# Patient Record
Sex: FEMALE | Race: WHITE | NOT HISPANIC OR LATINO | Employment: FULL TIME | URBAN - METROPOLITAN AREA
[De-identification: names, ages, dates, MRNs, and addresses within clinical notes are randomized per-mention and may not be internally consistent; named-entity substitution may affect disease eponyms.]

---

## 2020-09-08 DIAGNOSIS — G40.109 LOCALIZATION-RELATED FOCAL EPILEPSY WITH SIMPLE PARTIAL SEIZURES (H): Primary | ICD-10-CM

## 2020-09-08 RX ORDER — LEVETIRACETAM 500 MG/1
1 TABLET ORAL 2 TIMES DAILY
COMMUNITY
Start: 2020-08-03 | End: 2020-09-08

## 2020-09-08 RX ORDER — LEVETIRACETAM 500 MG/1
500 TABLET ORAL 2 TIMES DAILY
Qty: 60 TABLET | Refills: 0 | Status: SHIPPED | OUTPATIENT
Start: 2020-09-08 | End: 2020-10-09

## 2020-10-08 ENCOUNTER — TELEPHONE (OUTPATIENT)
Dept: NEUROLOGY | Facility: CLINIC | Age: 60
End: 2020-10-08

## 2020-10-08 DIAGNOSIS — G40.109 LOCALIZATION-RELATED FOCAL EPILEPSY WITH SIMPLE PARTIAL SEIZURES (H): ICD-10-CM

## 2020-10-08 NOTE — TELEPHONE ENCOUNTER
Pt lm that pharm had sent a request to refill her Keppra. Pt asking the status. [phone]   1. Follow up with SLP recommendation. 2. Consider discussion of GOC

## 2020-10-09 RX ORDER — LEVETIRACETAM 500 MG/1
500 TABLET ORAL 2 TIMES DAILY
Qty: 60 TABLET | Refills: 3 | Status: SHIPPED | OUTPATIENT
Start: 2020-10-09 | End: 2020-11-06

## 2020-10-09 NOTE — TELEPHONE ENCOUNTER
Patient informed we have not received refill request from pharmacy that I am aware of.  Patient reminded she is due for appointment which is scheduled for 11-6-2020.  Patient informed I would send refill request to Dr. Garvin.  Medication T'd for review and signature  Deana Hardy CMA on 10/9/2020 at 3:30 PM

## 2020-10-13 NOTE — TELEPHONE ENCOUNTER
I left message for patient that her refill was sent to pharmacy 10-9-2020 and asked for call back if she needs further assistance.  Deana Casanova RN on 10/13/2020 at 10:07 AM

## 2020-11-06 ENCOUNTER — VIRTUAL VISIT (OUTPATIENT)
Dept: NEUROLOGY | Facility: CLINIC | Age: 60
End: 2020-11-06
Payer: COMMERCIAL

## 2020-11-06 VITALS — BODY MASS INDEX: 21.19 KG/M2 | HEIGHT: 67 IN | WEIGHT: 135 LBS

## 2020-11-06 DIAGNOSIS — G40.109 LOCALIZATION-RELATED FOCAL EPILEPSY WITH SIMPLE PARTIAL SEIZURES (H): ICD-10-CM

## 2020-11-06 PROCEDURE — 99213 OFFICE O/P EST LOW 20 MIN: CPT | Mod: 95 | Performed by: PSYCHIATRY & NEUROLOGY

## 2020-11-06 RX ORDER — TRAZODONE HYDROCHLORIDE 50 MG/1
25-50 TABLET, FILM COATED ORAL AT BEDTIME
COMMUNITY
Start: 2020-09-24

## 2020-11-06 RX ORDER — CELECOXIB 200 MG/1
200 CAPSULE ORAL PRN
COMMUNITY

## 2020-11-06 RX ORDER — TRAMADOL HYDROCHLORIDE 50 MG/1
50-100 TABLET ORAL PRN
COMMUNITY
Start: 2020-10-12

## 2020-11-06 RX ORDER — LEVETIRACETAM 500 MG/1
500 TABLET ORAL 2 TIMES DAILY
Qty: 180 TABLET | Refills: 3 | Status: SHIPPED | OUTPATIENT
Start: 2020-11-06 | End: 2021-10-25

## 2020-11-06 RX ORDER — GABAPENTIN 100 MG/1
2 CAPSULE ORAL
COMMUNITY
Start: 2020-10-25

## 2020-11-06 RX ORDER — IPRATROPIUM BROMIDE 21 UG/1
2 SPRAY, METERED NASAL 2 TIMES DAILY
COMMUNITY
Start: 2020-08-27

## 2020-11-06 RX ORDER — ALBUTEROL SULFATE 90 UG/1
1 AEROSOL, METERED RESPIRATORY (INHALATION) PRN
COMMUNITY

## 2020-11-06 RX ORDER — OLOPATADINE HYDROCHLORIDE 1 MG/ML
1 SOLUTION/ DROPS OPHTHALMIC PRN
COMMUNITY

## 2020-11-06 RX ORDER — FLUOXETINE 20 MG/1
2 TABLET, FILM COATED ORAL DAILY
COMMUNITY
Start: 2020-10-28

## 2020-11-06 RX ORDER — FLUTICASONE FUROATE 100 UG/1
1 POWDER RESPIRATORY (INHALATION) DAILY
COMMUNITY
Start: 2020-10-17

## 2020-11-06 ASSESSMENT — MIFFLIN-ST. JEOR: SCORE: 1209.99

## 2020-11-06 NOTE — PATIENT INSTRUCTIONS
Nice to meet with you again today.  As we discussed, I think continuing on levetiracetam at this time would be warranted.  I refilled your levetiracetam for 1 years time.  We can see you in follow-up in 1 years time.  Good luck with your low back and left leg pain!

## 2020-11-06 NOTE — PROGRESS NOTES
"    Zuri Hutson  60 year old  MRN:[id number]  PCP: Risa Garnett  No ref. provider found    Allergies   Allergen Reactions     Animal Dander      Mold      Seasonal Allergies        Current Outpatient Medications   Medication Sig Dispense Refill     albuterol (PROAIR HFA/PROVENTIL HFA/VENTOLIN HFA) 108 (90 Base) MCG/ACT inhaler Inhale 1 puff into the lungs as needed for shortness of breath / dyspnea or wheezing       ARNUITY ELLIPTA 100 MCG/ACT inhaler Inhale 1 puff into the lungs daily       Calcium Carbonate-Vitamin D (CALCIUM-VITAMIN D3 PO) Take by mouth 2 times daily       celecoxib (CELEBREX) 200 MG capsule Take 200 mg by mouth as needed for moderate pain       DOCUSATE SODIUM PO Take by mouth daily       FLUoxetine 20 MG tablet Take 2 tablets by mouth daily       gabapentin (NEURONTIN) 100 MG capsule Take 2 capsules by mouth 3 times daily       ipratropium (ATROVENT) 0.03 % nasal spray Spray 2 sprays in nostril 2 times daily       levETIRAcetam (KEPPRA) 500 MG tablet Take 1 tablet (500 mg) by mouth 2 times daily 60 tablet 3     olopatadine (PATANOL) 0.1 % ophthalmic solution 1 drop as needed for allergies       Omega-3 Fatty Acids (FISH OIL PO) Take by mouth daily       Probiotic Product (PROBIOTIC DAILY PO) Take by mouth daily       traMADol (ULTRAM) 50 MG tablet Take  mg by mouth as needed       traZODone (DESYREL) 50 MG tablet Take 25-50 mg by mouth At Bedtime     Zuri Hutson is a 60 year old female who is being evaluated via a billable video visit.      The patient has been notified of following:     \"This video visit will be conducted via a call between you and your physician/provider. We have found that certain health care needs can be provided without the need for an in-person physical exam.  This service lets us provide the care you need with a video conversation.  If a prescription is necessary we can send it directly to your pharmacy.  If lab work is needed we can place an order for that and " "you can then stop by our lab to have the test done at a later time.    Video visits are billed at different rates depending on your insurance coverage.  Please reach out to your insurance provider with any questions.    If during the course of the call the physician/provider feels a video visit is not appropriate, you will not be charged for this service.\"    Patient has given verbal consent for Video visit? Yes  How would you like to obtain your AVS? Mail a copy  If you are dropped from the video visit, the video invite should be resent to: Send to e-mail at: [email or web address]  Will anyone else be joining your video visit? No        Video-Visit Details    Type of service:  Video Visit    Video Start Time: 10:34 AM  Video End Time: 10:47 AM    Originating Location (pt. Location): Home    Distant Location (provider location):  [facility] [place]     Platform used for Video Visit: Corey Garvin MD        CHIEF COMPLAINT: Follow-up of partial seizures    HISTORY SINCE LAST VISIT: I saw Ms. Hutson in virtual follow-up today of her partial seizures.  She has not had any loss of consciousness or awareness.  There have been no generalized tonic-clonic seizures.  She continues on levetiracetam, 500 mg twice daily.  She has not had any side effects with levetiracetam.  No irritability or anger issues.  No dizziness.  Since I had seen her last, she had had a left hemilaminectomy at L4-5 by Dr. Alamo for left leg pain.  1 month ago, she had recurrent pain and just had an epidural steroid injection yesterday which does appear to be helping.  No headache problems.  No loss of vision or double vision.  No speech or swallowing difficulties.      PAST MEDICAL HISTORY:   Surgical History    Surgery Date Site/Laterality Comments   [place] CONIZATION CERVIX KNIFE/LASER 1/1/1985 - 12/31/1985 1985     LEEP PROCEDURE 1/1/1988 - 12/31/1988   1988     WISDOM TEETH EXTRACTION          COLPOSCOPY " 11/01/2011   benign      COLONOSCOPY SCREENING 9/17/2012   [id number]     right breast cyst aspiration     early 2000s     JOINT REPLACEMENT 12/1/2015 - 12/31/2015 Left      COLPOSCOPY 08/22/2016   ECC DANIELLE I     COLPOSCOPY 11/24/2017   DANIELLE I     Extended lumbar hemilaminotomy, foraminotomy and diskectomy, L5, left sided 06/05/2020 Left by Dr. Richmond       Medical History    Medical History Date Comments   Asthma   mild   Seasonal allergies   Dust, animals   Depression       Screening cholesterol level 2008, 2011 wnl   Screening for thyroid disorder 2011 Normal per patient   Acne       ASCUS with positive high risk HPV 10/03/2011 ASCUS/HPV+   Seizures (HC)       Cervical high risk HPV (human papillomavirus) test positive 08/2018 8/2018, 07//28/2017,07/08/2016 NIL/HPV+. Plan: Colposcopy    Syncope       Arthritis           FAMILY HISTORY:  Family History   Problem Relation Age of Onset     Cancer Mother         Breast     Atrial fibrillation Father      Cancer Sister         Breast       SOCIAL HISTORY:  Social History     Socioeconomic History     Marital status:      Spouse name: Not on file     Number of children: Not on file     Years of education: Not on file     Highest education level: Not on file   Occupational History     Not on file   Social Needs     Financial resource strain: Not on file     Food insecurity     Worry: Not on file     Inability: Not on file     Transportation needs     Medical: Not on file     Non-medical: Not on file   Tobacco Use     Smoking status: Never Smoker     Smokeless tobacco: Never Used   Substance and Sexual Activity     Alcohol use: Yes     Comment: wine     Drug use: Never     Sexual activity: Not on file   Lifestyle     Physical activity     Days per week: Not on file     Minutes per session: Not on file     Stress: Not on file   Relationships     Social connections     Talks on phone: Not on file     Gets together: Not on file     Attends [employer] witness service: Not on  file     Active member of club or organization: Not on file     Attends meetings of clubs or organizations: Not on file     Relationship status: Not on file     Intimate partner violence     Fear of current or ex partner: Not on file     Emotionally abused: Not on file     Physically abused: Not on file     Forced sexual activity: Not on file   Other Topics Concern     Parent/sibling w/ CABG, MI or angioplasty before 65F 55M? Not Asked   Social History Narrative     Not on file       REVIEW OF SYSTEMS:    Constitutional: No fever chills weight loss weight gain    Eyes: No double vision or loss of vision   Musculoskeletal: Recurrent left leg pain.  Better after epidural steroid injection.  Neurologic: See above.      PHYSICAL EXAMINATION:    General appearance: No acute distress.       NEUROLOGIC EXAMINATION:    Alert oriented x3.  Speech is fluent.  Can tell me the name of the president.  Can spell world forward and backwards.  Naming and repetition normal.  Extraocular movements full.  Visual fields full.  Face symmetric.  Tongue midline.  No drift of arms.  Normal finger-nose-finger.      IMPRESSION: Focal onset seizures.  No recurrence since 2015.  These events have been very infrequent, with the first events having occurred in 2010.  I think it would be reasonable to have her continue on levetiracetam, as she has not had any side effects of the medication and no recurrent events.  She is in agreement.  I refilled her medication for 1 years time.  We will plan follow-up in 1 years time; sooner, if problems arise.            Lino Garvin MD

## 2020-11-06 NOTE — LETTER
"    11/6/2020         RE: Zuri Hutson  [address] [place] [id number]        Dear Colleague,    Thank you for referring your patient, Zuri Hutson, to the [facility] [place]. Please see a copy of my visit note below.        Zuri Hutson  60 year old  MRN:[id number]  PCP: Risa Garnett  No ref. provider found    Allergies   Allergen Reactions     Animal Dander      Mold      Seasonal Allergies        Current Outpatient Medications   Medication Sig Dispense Refill     albuterol (PROAIR HFA/PROVENTIL HFA/VENTOLIN HFA) 108 (90 Base) MCG/ACT inhaler Inhale 1 puff into the lungs as needed for shortness of breath / dyspnea or wheezing       ARNUITY ELLIPTA 100 MCG/ACT inhaler Inhale 1 puff into the lungs daily       Calcium Carbonate-Vitamin D (CALCIUM-VITAMIN D3 PO) Take by mouth 2 times daily       celecoxib (CELEBREX) 200 MG capsule Take 200 mg by mouth as needed for moderate pain       DOCUSATE SODIUM PO Take by mouth daily       FLUoxetine 20 MG tablet Take 2 tablets by mouth daily       gabapentin (NEURONTIN) 100 MG capsule Take 2 capsules by mouth 3 times daily       ipratropium (ATROVENT) 0.03 % nasal spray Spray 2 sprays in nostril 2 times daily       levETIRAcetam (KEPPRA) 500 MG tablet Take 1 tablet (500 mg) by mouth 2 times daily 60 tablet 3     olopatadine (PATANOL) 0.1 % ophthalmic solution 1 drop as needed for allergies       Omega-3 Fatty Acids (FISH OIL PO) Take by mouth daily       Probiotic Product (PROBIOTIC DAILY PO) Take by mouth daily       traMADol (ULTRAM) 50 MG tablet Take  mg by mouth as needed       traZODone (DESYREL) 50 MG tablet Take 25-50 mg by mouth At Bedtime     Zuri Hutson is a 60 year old female who is being evaluated via a billable video visit.      The patient has been notified of following:     \"This video visit will be conducted via a call between you and your physician/provider. We have found that certain health care needs can be provided " "without the need for an in-person physical exam.  This service lets us provide the care you need with a video conversation.  If a prescription is necessary we can send it directly to your pharmacy.  If lab work is needed we can place an order for that and you can then stop by our lab to have the test done at a later time.    Video visits are billed at different rates depending on your insurance coverage.  Please reach out to your insurance provider with any questions.    If during the course of the call the physician/provider feels a video visit is not appropriate, you will not be charged for this service.\"    Patient has given verbal consent for Video visit? Yes  How would you like to obtain your AVS? Mail a copy  If you are dropped from the video visit, the video invite should be resent to: Send to e-mail at: [email or web address]  Will anyone else be joining your video visit? No        Video-Visit Details    Type of service:  Video Visit    Video Start Time: 10:34 AM  Video End Time: 10:47 AM    Originating Location (pt. Location): Home    Distant Location (provider location):  [facility] [place]     Platform used for Video Visit: Corey Garvin MD        CHIEF COMPLAINT: Follow-up of partial seizures    HISTORY SINCE LAST VISIT: I saw Ms. Hutson in virtual follow-up today of her partial seizures.  She has not had any loss of consciousness or awareness.  There have been no generalized tonic-clonic seizures.  She continues on levetiracetam, 500 mg twice daily.  She has not had any side effects with levetiracetam.  No irritability or anger issues.  No dizziness.  Since I had seen her last, she had had a left hemilaminectomy at L4-5 by Dr. Alamo for left leg pain.  1 month ago, she had recurrent pain and just had an epidural steroid injection yesterday which does appear to be helping.  No headache problems.  No loss of vision or double vision.  No speech or swallowing " difficulties.      PAST MEDICAL HISTORY:   Surgical History    Surgery Date Site/Laterality Comments   [place] CONIZATION CERVIX KNIFE/LASER 1/1/1985 - 12/31/1985 1985     LEEP PROCEDURE 1/1/1988 - 12/31/1988   1988     WISDOM TEETH EXTRACTION          COLPOSCOPY 11/01/2011   benign      COLONOSCOPY SCREENING 9/17/2012   [id number]     right breast cyst aspiration     early 2000s     JOINT REPLACEMENT 12/1/2015 - 12/31/2015 Left      COLPOSCOPY 08/22/2016   ECC DANIELLE I     COLPOSCOPY 11/24/2017   DANIELLE I     Extended lumbar hemilaminotomy, foraminotomy and diskectomy, L5, left sided 06/05/2020 Left by Dr. Richmond       Medical History    Medical History Date Comments   Asthma   mild   Seasonal allergies   Dust, animals   Depression       Screening cholesterol level 2008, 2011 wnl   Screening for thyroid disorder 2011 Normal per patient   Acne       ASCUS with positive high risk HPV 10/03/2011 ASCUS/HPV+   Seizures (HC)       Cervical high risk HPV (human papillomavirus) test positive 08/2018 8/2018, 07//28/2017,07/08/2016 NIL/HPV+. Plan: Colposcopy    Syncope       Arthritis           FAMILY HISTORY:  Family History   Problem Relation Age of Onset     Cancer Mother         Breast     Atrial fibrillation Father      Cancer Sister         Breast       SOCIAL HISTORY:  Social History     Socioeconomic History     Marital status:      Spouse name: Not on file     Number of children: Not on file     Years of education: Not on file     Highest education level: Not on file   Occupational History     Not on file   Social Needs     Financial resource strain: Not on file     Food insecurity     Worry: Not on file     Inability: Not on file     Transportation needs     Medical: Not on file     Non-medical: Not on file   Tobacco Use     Smoking status: Never Smoker     Smokeless tobacco: Never Used   Substance and Sexual Activity     Alcohol use: Yes     Comment: wine     Drug use: Never     Sexual activity: Not on file    Lifestyle     Physical activity     Days per week: Not on file     Minutes per session: Not on file     Stress: Not on file   Relationships     Social connections     Talks on phone: Not on file     Gets together: Not on file     Attends [employer] service: Not on file     Active member of club or organization: Not on file     Attends meetings of clubs or organizations: Not on file     Relationship status: Not on file     Intimate partner violence     Fear of current or ex partner: Not on file     Emotionally abused: Not on file     Physically abused: Not on file     Forced sexual activity: Not on file   Other Topics Concern     Parent/sibling w/ CABG, MI or angioplasty before 65F 55M? Not Asked   Social History Narrative     Not on file       REVIEW OF SYSTEMS:    Constitutional: No fever chills weight loss weight gain    Eyes: No double vision or loss of vision   Musculoskeletal: Recurrent left leg pain.  Better after epidural steroid injection.  Neurologic: See above.      PHYSICAL EXAMINATION:    General appearance: No acute distress.       NEUROLOGIC EXAMINATION:    Alert oriented x3.  Speech is fluent.  Can tell me the name of the president.  Can spell world forward and backwards.  Naming and repetition normal.  Extraocular movements full.  Visual fields full.  Face symmetric.  Tongue midline.  No drift of arms.  Normal finger-nose-finger.      IMPRESSION: Focal onset seizures.  No recurrence since 2015.  These events have been very infrequent, with the first events having occurred in 2010.  I think it would be reasonable to have her continue on levetiracetam, as she has not had any side effects of the medication and no recurrent events.  She is in agreement.  I refilled her medication for 1 years time.  We will plan follow-up in 1 years time; sooner, if problems arise.            Lino Garvin MD      Again, thank you for allowing me to participate in the care of your patient.         Sincerely,        Lino Garvin MD, MD

## 2020-11-06 NOTE — NURSING NOTE
Computer video visit [email or web address]  Chief Complaint   Patient presents with     Seizures     Routine follow up for medication refills.  No problems , no seizures.     Deana Casanova RN on 11/6/2020 at 10:18 AM

## 2021-01-14 ENCOUNTER — HEALTH MAINTENANCE LETTER (OUTPATIENT)
Age: 61
End: 2021-01-14

## 2021-03-13 ENCOUNTER — NURSE TRIAGE (OUTPATIENT)
Dept: NURSING | Facility: CLINIC | Age: 61
End: 2021-03-13

## 2021-03-14 ENCOUNTER — OFFICE VISIT (OUTPATIENT)
Dept: URGENT CARE | Facility: URGENT CARE | Age: 61
End: 2021-03-14
Payer: COMMERCIAL

## 2021-03-14 VITALS
HEIGHT: 67 IN | BODY MASS INDEX: 21.19 KG/M2 | SYSTOLIC BLOOD PRESSURE: 112 MMHG | TEMPERATURE: 99.3 F | HEART RATE: 78 BPM | WEIGHT: 135 LBS | OXYGEN SATURATION: 97 % | DIASTOLIC BLOOD PRESSURE: 73 MMHG

## 2021-03-14 DIAGNOSIS — H11.31 SUBCONJUNCTIVAL HEMORRHAGE OF RIGHT EYE: Primary | ICD-10-CM

## 2021-03-14 LAB
BASOPHILS NFR BLD AUTO: 0.7 %
DIFFERENTIAL METHOD BLD: NORMAL
EOSINOPHIL NFR BLD AUTO: 4 %
ERYTHROCYTE [DISTWIDTH] IN BLOOD BY AUTOMATED COUNT: 13.5 % (ref 10–15)
HCT VFR BLD AUTO: 44.6 % (ref 35–47)
HGB BLD-MCNC: 14.4 G/DL (ref 11.7–15.7)
LYMPHOCYTES NFR BLD AUTO: 43.9 %
MCH RBC QN AUTO: 31.4 PG (ref 26.5–33)
MCHC RBC AUTO-ENTMCNC: 32.3 G/DL (ref 31.5–36.5)
MCV RBC AUTO: 97 FL (ref 78–100)
MONOCYTES NFR BLD AUTO: 9.4 %
NEUTROPHILS NFR BLD AUTO: 42 %
PLATELET # BLD AUTO: 184 10E9/L (ref 150–450)
RBC # BLD AUTO: 4.59 10E12/L (ref 3.8–5.2)
WBC # BLD AUTO: 4.3 10E9/L (ref 4–11)

## 2021-03-14 PROCEDURE — 85025 COMPLETE CBC W/AUTO DIFF WBC: CPT | Performed by: PHYSICIAN ASSISTANT

## 2021-03-14 PROCEDURE — 85730 THROMBOPLASTIN TIME PARTIAL: CPT | Performed by: PHYSICIAN ASSISTANT

## 2021-03-14 PROCEDURE — 85610 PROTHROMBIN TIME: CPT | Performed by: PHYSICIAN ASSISTANT

## 2021-03-14 PROCEDURE — 99203 OFFICE O/P NEW LOW 30 MIN: CPT | Performed by: PHYSICIAN ASSISTANT

## 2021-03-14 PROCEDURE — 36415 COLL VENOUS BLD VENIPUNCTURE: CPT | Performed by: PHYSICIAN ASSISTANT

## 2021-03-14 ASSESSMENT — MIFFLIN-ST. JEOR: SCORE: 1204.99

## 2021-03-14 NOTE — PROGRESS NOTES
Subconjunctival hemorrhage of right eye  - CBC with platelets and differential  - Partial thromboplastin time  - INR    Patient given reassurance that condition is self-limited. Follow up with Opthalmology in 2-3 weeks.     20 minutes spent on the date of the encounter doing chart review, history and exam, documentation and further activities as noted above     See Patient Instructions  Patient Instructions     Patient Education     Subconjunctival Hemorrhage    A subconjunctival hemorrhage is a result of a broken blood vessel in the white part of the eye. It is usually painless. It may be caused by coughing, sneezing, or vomiting. An injury to the eye can cause this condition, too. It can also be a sign of high blood pressure (hypertension) or a bleeding disorder.   This eye problem can look scary. But the presence of the blood is not serious. It will be reabsorbed without treatment within a few days to a few weeks.   Home care  You may continue your usual activities.  Follow-up care  Follow up with your healthcare provider, or as advised.  When to seek medical advice  Call your healthcare provider or seek medical care right away if any of these occur:     Pain in the eye    Change in vision    The blood does not go away within 3 weeks    Increasing redness or swelling of the eye    Severe headache or dizziness    Signs of bruising or bleeding from other parts of your body  Pharmly last reviewed this educational content on 6/1/2020 2000-2020 [employer]. All rights reserved. This information is not intended as a substitute for professional medical care. Always follow your healthcare professional's instructions.               Bryson Kuhn PA-C  [facility]    Subjective   61 year old year old who presents to clinic today for the following health issues:    Urgent Care and Eye Problem       HPI   Patient visits for right eye bleeding. No pain or discomfort. No vision loss. No  trauma. BP is normal. No Family Hx of bleeding disorder.   Eye(s) Problem  Onset/Duration: yesterday  Description:   Location: Right eye  Pain: no  Redness: YES  Accompanying Signs & Symptoms:  Discharge/mattering: no  Swelling: no  Visual changes: no  Fever: no  Nasal Congestion: no  Bothered by bright lights: no  History:  Trauma: no  Foreign body exposure: no  Wearing contacts: no  Precipitating or alleviating factors: None  Therapies tried and outcome: None      Review of Systems   Review of Systems   See HPI    Objective    Temp: 99.3  F (37.4  C) Temp src: Oral BP: 112/73 Pulse: 78     SpO2: 97 %       Physical Exam   Physical Exam  Constitutional:       General: She is not in acute distress.     Appearance: Normal appearance. She is obese. She is not ill-appearing or toxic-appearing.   HENT:      Head: Normocephalic and atraumatic.   Eyes:      General: Lids are normal.         Right eye: No foreign body, discharge or hordeolum.         Left eye: No foreign body, discharge or hordeolum.      Extraocular Movements: Extraocular movements intact.      Conjunctiva/sclera:      Right eye: Right conjunctiva is not injected. Hemorrhage present. No exudate.     Left eye: Left conjunctiva is not injected. No exudate or hemorrhage.     Pupils: Pupils are equal, round, and reactive to light.   Cardiovascular:      Rate and Rhythm: Normal rate and regular rhythm.      Pulses: Normal pulses.      Heart sounds: Normal heart sounds.   Pulmonary:      Effort: Pulmonary effort is normal. No respiratory distress.      Breath sounds: Normal breath sounds.   Neurological:      Mental Status: She is alert.   Psychiatric:         Mood and Affect: Mood normal.         Behavior: Behavior normal.         Thought Content: Thought content normal.         Judgment: Judgment normal.          Results for orders placed or performed in visit on 03/14/21 (from the past 24 hour(s))   CBC with platelets and differential   Result Value Ref  Range    WBC 4.3 4.0 - 11.0 10e9/L    RBC Count 4.59 3.8 - 5.2 10e12/L    Hemoglobin 14.4 11.7 - 15.7 g/dL    Hematocrit 44.6 35.0 - 47.0 %    MCV 97 78 - 100 fl    MCH 31.4 26.5 - 33.0 pg    MCHC 32.3 31.5 - 36.5 g/dL    RDW 13.5 10.0 - 15.0 %    Platelet Count 184 150 - 450 10e9/L    Diff Method Automated Method     % Neutrophils 42.0 %    % Lymphocytes 43.9 %    % Monocytes 9.4 %    % Eosinophils 4.0 %    % Basophils 0.7 %

## 2021-03-14 NOTE — TELEPHONE ENCOUNTER
Pt called stating she has hematoma on her left eye. Pt reported 10 minutes she itched and rubbing her left eye, and she looked merroir and noticed the corner of her left eye to the ires is red,and around the other side. Pt denied pain, blurred vision, injury,or drainage.    Per protocol pt was advised to be seen with in 24 hours and to call back if she notices fever, pain, or blurred vision or becomes worse to go to the ER.    Jerald Haq RN  [facility]         COVID 19 Nurse Triage Plan/Patient Instructions    Please be aware that novel coronavirus (COVID-19) may be circulating in the community. If you develop symptoms such as fever, cough, or SOB or if you have concerns about the presence of another infection including coronavirus (COVID-19), please contact your health care provider or visit [email or web address].     Disposition/Instructions    In-Person Visit with provider recommended. Reference Visit Selection Guide.    Thank you for taking steps to prevent the spread of this virus.  o Limit your contact with others.  o Wear a simple mask to cover your cough.  o Wash your hands well and often.    Resources    [facility]: About COVID-19: [email or web address]    CDC: What to Do If You're Sick: www.cdc.gov/coronavirus/2019-ncov/about/steps-when-sick.html    CDC: Ending Home Isolation: www.cdc.gov/coronavirus/2019-ncov/hcp/disposition-in-home-patients.html     CDC: Caring for Someone: www.cdc.gov/coronavirus/2019-ncov/if-you-are-sick/care-for-someone.html     [facility]: Interim Guidance for Hospital Discharge to Home: www.health.[facility].mn.us/diseases/coronavirus/hcp/hospdischarge.pdf    [facility] clinical trials (COVID-19 research studies): clinicalaffairs.[facility]/umn-clinical-trials     Below are the COVID-19 hotlines at the [place] [facility] of [facility] ([facility]). Interpreters are available.   o For health questions: Call 430-517-7255 or 1-134.465.5803 (7 a.m.  "to 7 p.m.)  o For questions about schools and childcare: Call 546-700-6566 or 1-470.176.2317 (7 a.m. to 7 p.m.)                   Additional Information    Negative: Chemical got in the eye    Negative: Piece of something got in the eye    Negative: Eye redness followed an eye injury    Negative: Yellow or green pus in the eyes    Negative: [1] Eyelid is swollen AND [2] no redness of white of eye (sclera)    Negative: Caused by pollen or other allergy OR the main symptom is itchy eyes    Negative: Red, widespread rash also present    Negative: SEVERE eye pain    Negative: [1] Eyelids are very swollen (shut or almost) AND [2] fever    Negative: [1] Eyelid (outer) is very red (or tender to touch) AND [2] fever    Negative: [1] Foreign body sensation (\"feels like something is in there\") AND [2] irrigation didn't help    Negative: Vomiting    Negative: [1] Recent eye surgery AND [2] increasing eye pain    Negative: Patient sounds very sick or weak to the triager    Negative: Blurred vision    Negative: [1] Eye pain AND [2] more than mild    Negative: Cloudy spot or sore seen on the cornea (clear part of the eye)    Negative: Eyelid is very swollen (shut or almost)    Negative: Eyelid is red and painful (or tender to touch)    Negative: Eye pain present > 24 hours    [1] Bleeding on white of the eye AND [2] taking Coumadin (warfarin) or other strong blood thinner, or known bleeding disorder (e.g., thrombocytopenia)    Protocols used: EYE - RED WITHOUT PUS-A-AH      "

## 2021-03-14 NOTE — PATIENT INSTRUCTIONS
Patient Education     Subconjunctival Hemorrhage    A subconjunctival hemorrhage is a result of a broken blood vessel in the white part of the eye. It is usually painless. It may be caused by coughing, sneezing, or vomiting. An injury to the eye can cause this condition, too. It can also be a sign of high blood pressure (hypertension) or a bleeding disorder.   This eye problem can look scary. But the presence of the blood is not serious. It will be reabsorbed without treatment within a few days to a few weeks.   Home care  You may continue your usual activities.  Follow-up care  Follow up with your healthcare provider, or as advised.  When to seek medical advice  Call your healthcare provider or seek medical care right away if any of these occur:     Pain in the eye    Change in vision    The blood does not go away within 3 weeks    Increasing redness or swelling of the eye    Severe headache or dizziness    Signs of bruising or bleeding from other parts of your body  [employer] last reviewed this educational content on 6/1/2020 2000-2020 [employer]. All rights reserved. This information is not intended as a substitute for professional medical care. Always follow your healthcare professional's instructions.

## 2021-03-15 LAB
APTT PPP: 31 SEC (ref 22–37)
INR PPP: 0.97 (ref 0.86–1.14)

## 2021-10-24 ENCOUNTER — HEALTH MAINTENANCE LETTER (OUTPATIENT)
Age: 61
End: 2021-10-24

## 2021-10-25 DIAGNOSIS — G40.109 LOCALIZATION-RELATED FOCAL EPILEPSY WITH SIMPLE PARTIAL SEIZURES (H): ICD-10-CM

## 2021-10-25 RX ORDER — LEVETIRACETAM 500 MG/1
500 TABLET ORAL 2 TIMES DAILY
Qty: 60 TABLET | Refills: 0 | Status: SHIPPED | OUTPATIENT
Start: 2021-10-25 | End: 2021-12-09

## 2021-10-25 NOTE — TELEPHONE ENCOUNTER
Refill request for levetiracetam.  Previous pt of Dr. Garvin. Last seen 11/6/20.  Due for appt with another provider as Dr. Garvin is no longer here.  Letter mailed to pt reminding pt to schedule with another provider.  Medication T'd for review and signature    Leanna Gurrola LPN on 10/25/2021 at 11:57 AM

## 2021-10-25 NOTE — LETTER
10/25/2021        RE: Zuri Hutson  [address] [place] [phone]                Dear Zuri,        We recently provided you with medication refills.  Many medications require routine follow-up with your doctor.    Your prescription(s) have been refilled for 30 days so you may have time for the above noted follow-up. Please call to schedule soon so we can assure you have an appointment before your next refills are needed. If you have already made a follow up appointment, please disregard this letter.     Dr. Garvin is no longer at our clinic. Please call to schedule an appointment with another provider for continuing care and medication management.      Sincerely,        [facility] [place]     (Formerly known as [facility])

## 2021-12-08 DIAGNOSIS — G40.109 LOCALIZATION-RELATED FOCAL EPILEPSY WITH SIMPLE PARTIAL SEIZURES (H): ICD-10-CM

## 2021-12-08 NOTE — TELEPHONE ENCOUNTER
Patient scheduled first available with New Provider in March. Would like refill on RX to get through to time of appointment.

## 2021-12-09 RX ORDER — LEVETIRACETAM 500 MG/1
500 TABLET ORAL 2 TIMES DAILY
Qty: 180 TABLET | Refills: 1 | Status: SHIPPED | OUTPATIENT
Start: 2021-12-09 | End: 2022-06-08

## 2021-12-09 NOTE — TELEPHONE ENCOUNTER
Refill request for Keppra  Follow up scheduled with Dr Domingo 3/11/22  Medication T'd for review and signature  Dasia Conner CMA on 12/9/2021 at 1:03 PM

## 2022-02-13 ENCOUNTER — HEALTH MAINTENANCE LETTER (OUTPATIENT)
Age: 62
End: 2022-02-13

## 2022-06-08 ENCOUNTER — TELEPHONE (OUTPATIENT)
Dept: NEUROLOGY | Facility: CLINIC | Age: 62
End: 2022-06-08
Payer: COMMERCIAL

## 2022-06-08 DIAGNOSIS — G40.109 LOCALIZATION-RELATED FOCAL EPILEPSY WITH SIMPLE PARTIAL SEIZURES (H): ICD-10-CM

## 2022-06-08 RX ORDER — LEVETIRACETAM 500 MG/1
500 TABLET ORAL 2 TIMES DAILY
Qty: 60 TABLET | Refills: 0 | Status: SHIPPED | OUTPATIENT
Start: 2022-06-08 | End: 2022-06-10

## 2022-06-08 NOTE — TELEPHONE ENCOUNTER
Refill request for Keppra. Pt last seen 11/6/20 and has follow up scheduled for 6/10/22 with Dr. Domingo. Will send in refill.     Erinn Trinh RN on 6/8/2022 at 8:20 AM

## 2022-06-08 NOTE — TELEPHONE ENCOUNTER
Mónica sent a refill request for Levetiracetam 500mg 1 tab bid #180. Pt will be out of med prior to her 6-10 appt with Dr Domingo

## 2022-06-10 ENCOUNTER — OFFICE VISIT (OUTPATIENT)
Dept: NEUROLOGY | Facility: CLINIC | Age: 62
End: 2022-06-10
Payer: COMMERCIAL

## 2022-06-10 ENCOUNTER — LAB (OUTPATIENT)
Dept: LAB | Facility: HOSPITAL | Age: 62
End: 2022-06-10
Payer: COMMERCIAL

## 2022-06-10 VITALS
WEIGHT: 144 LBS | HEIGHT: 67 IN | SYSTOLIC BLOOD PRESSURE: 110 MMHG | BODY MASS INDEX: 22.6 KG/M2 | HEART RATE: 60 BPM | DIASTOLIC BLOOD PRESSURE: 75 MMHG

## 2022-06-10 DIAGNOSIS — G40.909 SEIZURE DISORDER (H): ICD-10-CM

## 2022-06-10 DIAGNOSIS — G40.909 SEIZURE DISORDER (H): Primary | ICD-10-CM

## 2022-06-10 DIAGNOSIS — Z79.899 ENCOUNTER FOR LONG-TERM (CURRENT) USE OF MEDICATIONS: ICD-10-CM

## 2022-06-10 DIAGNOSIS — G40.109 LOCALIZATION-RELATED FOCAL EPILEPSY WITH SIMPLE PARTIAL SEIZURES (H): ICD-10-CM

## 2022-06-10 LAB
ANION GAP SERPL CALCULATED.3IONS-SCNC: 5 MMOL/L (ref 5–18)
BUN SERPL-MCNC: 19 MG/DL (ref 8–22)
CALCIUM SERPL-MCNC: 9.3 MG/DL (ref 8.5–10.5)
CHLORIDE BLD-SCNC: 105 MMOL/L (ref 98–107)
CO2 SERPL-SCNC: 30 MMOL/L (ref 22–31)
CREAT SERPL-MCNC: 0.73 MG/DL (ref 0.6–1.1)
GFR SERPL CREATININE-BSD FRML MDRD: >90 ML/MIN/1.73M2
GLUCOSE BLD-MCNC: 87 MG/DL (ref 70–125)
POTASSIUM BLD-SCNC: 4.5 MMOL/L (ref 3.5–5)
SODIUM SERPL-SCNC: 140 MMOL/L (ref 136–145)

## 2022-06-10 PROCEDURE — 99215 OFFICE O/P EST HI 40 MIN: CPT | Performed by: PSYCHIATRY & NEUROLOGY

## 2022-06-10 PROCEDURE — 36415 COLL VENOUS BLD VENIPUNCTURE: CPT

## 2022-06-10 PROCEDURE — 80177 DRUG SCRN QUAN LEVETIRACETAM: CPT

## 2022-06-10 PROCEDURE — 82310 ASSAY OF CALCIUM: CPT

## 2022-06-10 RX ORDER — LEVETIRACETAM 500 MG/1
500 TABLET ORAL 2 TIMES DAILY
Qty: 180 TABLET | Refills: 3 | Status: SHIPPED | OUTPATIENT
Start: 2022-06-10 | End: 2023-07-06

## 2022-06-10 RX ORDER — BUSPIRONE HYDROCHLORIDE 10 MG/1
TABLET ORAL
COMMUNITY
Start: 2021-09-24

## 2022-06-10 NOTE — PATIENT INSTRUCTIONS
Plan:  -- Labs: Keppra level and metabolic panel.  We will notify you of the results.  -- Continue the Keppra: 500mg twice daily.    -- Follow-up in Neurology clinic in 1 year.  Please let us know if any concerns arise in the meantime.

## 2022-06-10 NOTE — PROGRESS NOTES
INITIAL NEUROLOGY CONSULTATION - Transfer of Care    DATE OF VISIT: 6/10/2022  MRN: [id number]  PATIENT NAME: Zuri Hutson  YOB: 1960    REFERRING PROVIDER: Referred Self    Chief Complaint   Patient presents with     Seizures     Transfer care Dr. Garvin- no recent episode        SUBJECTIVE:                                                      HPI:   Zuri Hutson is a 62 year old female here to establish care for partial seizure disorder.  She previously followed with Dr. Garvin in this clinic for the same.  When they last spoke in 11.2020, she denied seizure recurrence.  She has been on Keppra: 500mg twice daily.  Additional history of foramenectomy done by Dr. Richmond.    Looking further back in the chart, she saw Dr. Guzmán at the [facility] in 2010 after an episode of loss of consciousness that occurred during a meeting.  She had been taken to [facility] for evaluation.  No particular stressors prior.  She was described as limp and slightly tremulous, and when she presented to the ER she was somewhat confused.  No tongue bite.  She recalled having a feeling of panic and rising abdominal sensation prior to the event.  No history of meningitis, encephalitis or head injury.  History of 1 seizure and a nephew.  She has otherwise been quite healthy.  She does have asthma.  EEG in 7.2010 was normal.  MRI brain at that time was also normal.  Keppra level was therapeutic at 32.9 in 2019.    Zuri says that several years ago she had a few days of aura sensations after that first event so she believes that at that time they decided to start her on the Keppra.  She has been on it since and has not had any auras or recurrence of seizure for many years.  She says that she has no concerns about the Keppra, no side effects.  Denies headaches, visual changes, weakness or sensory loss.    She tells me that she was having left leg sciatic pain prior to her surgery.  Symptoms have since resolved.  I note she is also  on gabapentin.    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    No past medical history on file.  No past surgical history on file.    albuterol (PROAIR HFA/PROVENTIL HFA/VENTOLIN HFA) 108 (90 Base) MCG/ACT inhaler, Inhale 1 puff into the lungs as needed for shortness of breath / dyspnea or wheezing  ARNUITY ELLIPTA 100 MCG/ACT inhaler, Inhale 1 puff into the lungs daily  busPIRone (BUSPAR) 10 MG tablet, TAKE 1 TABLET BY MOUTH EVERY DAY FOR 3 DAYS. INCREASE TO 1 TABLET TWICE DAILY FOR 3 DAYS, THEN 1 TABLET AM AND 2 TABLETS PM THEREAFTER. Schedule follow up before additional refill needed  Calcium Carbonate-Vitamin D (CALCIUM-VITAMIN D3 PO), Take by mouth 2 times daily  DOCUSATE SODIUM PO, Take by mouth daily  FLUoxetine 20 MG tablet, Take 2 tablets by mouth daily  gabapentin (NEURONTIN) 100 MG capsule, Take 2 capsules by mouth 2 capsules twice a day  ipratropium (ATROVENT) 0.03 % nasal spray, Spray 2 sprays in nostril 2 times daily  olopatadine (PATANOL) 0.1 % ophthalmic solution, 1 drop as needed for allergies  Omega-3 Fatty Acids (FISH OIL PO), Take by mouth daily  Probiotic Product (PROBIOTIC DAILY PO), Take by mouth daily  traMADol (ULTRAM) 50 MG tablet, Take  mg by mouth as needed  traZODone (DESYREL) 50 MG tablet, Take 25-50 mg by mouth At Bedtime  celecoxib (CELEBREX) 200 MG capsule, Take 200 mg by mouth as needed for moderate pain (Patient not taking: Reported on 6/10/2022)    No current facility-administered medications on file prior to visit.    Allergies   Allergen Reactions     Animal Dander      Mold      Seasonal Allergies         Problem (# of Occurrences) Relation (Name,Age of Onset)    Atrial fibrillation (1) Father    Cancer (2) Mother: Breast, Sister: Breast        Social History     Tobacco Use     Smoking status: Never Smoker     Smokeless tobacco: Never Used   Substance Use Topics     Alcohol use: Yes     Comment: a glass of wine with dinner     Drug use: Never       REVIEW OF SYSTEMS:             "                                          10-point review of systems is negative except as mentioned above in HPI.     EXAM:                                                      Physical Exam:   Vitals: /75 (BP Location: Right arm, Patient Position: Sitting)   Pulse 60   Ht 1.702 m (5' 7\")   Wt 65.3 kg (144 lb)   BMI 22.55 kg/m    BMI= Body mass index is 22.55 kg/m .  GENERAL: NAD.  HEENT: NC/AT.   CV: RRR. S1, S2.   NECK: No bruits.  PULM: Non-labored breathing.   Neurologic:  MENTAL STATUS: Alert, attentive. Speech is fluent. Normal comprehension. Normal concentration. Adequate fund of knowledge.   CRANIAL NERVES: Discs technically difficult to visualize. Visual fields intact to confrontation. Pupils equally, round and reactive to light. Facial sensation and movement normal. EOM full. Hearing intact to conversation. Trapezius strength intact. Palate moves symmetrically. Tongue midline.  MOTOR: 5/5 in proximal and distal muscle groups of upper and lower extremities. Tone and bulk normal.   DTRs: Intact and symmetric in biceps, BR, patellae. Babinski down-going bilaterally.   SENSATION: Normal light touch and pinprick. Intact proprioception at great toes. Vibration: Normal at both ankles.   COORDINATION: Normal finger nose finger. Finger tapping normal. Knee heel shin normal.  STATION AND GAIT: Casual gait is normal.  Left hand-dominant.    Relevant Data:  MRI Brain (7.2.10):  Findings:       There is no mass affect or midline shift. The ventricles are not   enlarged out of proportion to the cerebral sulci. The gray white   matter differentiation of the cerebral hemispheres is preserved. No   definite abnormal signal is visualized within the medial temporal   lobes, and there is no definite atrophy or volume loss in those areas.           Postcontrast images demonstrate no abnormal intracranial enhancing   lesions.       The major intracranial vascular flow-voids appear patent. The orbits,   the visualized " portions of the paranasal sinuses, and mastoid air   cells are relatively unremarkable.       Impression:       1. No evidence of abnormal enhancing lesions intracranially.   2. The visualized intracranial structures appear normal.    Electroencephalogram (7.2.10):  RESULTS:  This electroencephalogram shows generally a normal background approximately 10 Hz.  This is fairly well modulated and symmetrical.  Beta activities are seen mild and anteriorly prominent.  Eye leads were utilized during the recording and eye movements were noted.  Photic stimulation produced an excellent driving response which appeared better developed on the right.  It was present on the left.     Sleep activities are clearly noted in the recording.  Hyperventilation activation was conducted, and no abnormalities were induced during hyperventilation.  Following hyperventilation, the patient was intermittently sleep.  Sleep architecture was normal.  There was question of frontopolar spikes sharp waves but this was deemed to be an artifact.  The sleep portion of the recording shows no abnormalities and no activation of any abnormal discharges are seen in the record.       EKG showed a regular rhythm of approximately 60.     CONCLUSION:  A normal awake and sleep study with sleep deprivation.     ASSESSMENT and PLAN:                                                      Assessment:     ICD-10-CM    1. Seizure disorder (H)  G40.909 Keppra (Levetiracetam) Level     Basic metabolic panel   2. Localization-related focal epilepsy with simple partial seizures (H)  G40.109 levETIRAcetam (KEPPRA) 500 MG tablet   3. Encounter for long-term (current) use of medications  Z79.899 Keppra (Levetiracetam) Level     Basic metabolic panel        Ms. Hutson is a pleasant 62-year-old woman here for follow-up regarding epilepsy.  She has history of one presumed seizure and auras for which she was started on Keppra.  She has been seizure and aura free since.  No  problems with side effects on the Keppra so we decided to continue at the current dosing and check a level with the metabolic panel today.  We did discuss possibly tapering her off of the medication but she would like to continue.  I would like to see Zuri back in clinic in 1 year.  She expressed understanding and agreement with the plan.    Plan:  -- Labs: Keppra level and metabolic panel.  We will notify you of the results.  -- Continue the Keppra: 500mg twice daily.    -- Follow-up in Neurology clinic in 1 year.  Please let us know if any concerns arise in the meantime.    Total Time: 40 minutes were spent with the patient and in chart review/documentation (as described above in Assessment and Plan) /coordinating the care on date of service.    Gloria Domingo MD  Neurology    CC: [employer] software used in the dictation of this note.

## 2022-06-10 NOTE — NURSING NOTE
Chief Complaint   Patient presents with     Seizures     Transfer care Dr. Garvin- no recent episode      Doni Garcia CMA@ on 6/10/2022 at 8:49 AM

## 2022-06-10 NOTE — LETTER
6/10/2022         RE: Zuri Hutson  [address] [place] [id number]        Dear Colleague,    Thank you for referring your patient, Zuri Hutson, to the [facility] [place]. Please see a copy of my visit note below.    INITIAL NEUROLOGY CONSULTATION - Transfer of Care    DATE OF VISIT: 6/10/2022  MRN: [id number]  PATIENT NAME: Zuri Huston  YOB: 1960    REFERRING PROVIDER: Referred Self    Chief Complaint   Patient presents with     Seizures     Transfer care Dr. Garvin- no recent episode        SUBJECTIVE:                                                      HPI:   Zuri Hutson is a 62 year old female here to establish care for partial seizure disorder.  She previously followed with Dr. Garvin in this clinic for the same.  When they last spoke in 11.2020, she denied seizure recurrence.  She has been on Keppra: 500mg twice daily.  Additional history of foramenectomy done by Dr. Richmond.    Looking further back in the chart, she saw Dr. Guzmán at the [facility] in 2010 after an episode of loss of consciousness that occurred during a meeting.  She had been taken to [facility] for evaluation.  No particular stressors prior.  She was described as limp and slightly tremulous, and when she presented to the ER she was somewhat confused.  No tongue bite.  She recalled having a feeling of panic and rising abdominal sensation prior to the event.  No history of meningitis, encephalitis or head injury.  History of 1 seizure and a nephew.  She has otherwise been quite healthy.  She does have asthma.  EEG in 7.2010 was normal.  MRI brain at that time was also normal.  Keppra level was therapeutic at 32.9 in 2019.    Zuri says that several years ago she had a few days of aura sensations after that first event so she believes that at that time they decided to start her on the Keppra.  She has been on it since and has not had any auras or recurrence of seizure for many years.  She  says that she has no concerns about the Keppra, no side effects.  Denies headaches, visual changes, weakness or sensory loss.    She tells me that she was having left leg sciatic pain prior to her surgery.  Symptoms have since resolved.  I note she is also on gabapentin.    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    No past medical history on file.  No past surgical history on file.    albuterol (PROAIR HFA/PROVENTIL HFA/VENTOLIN HFA) 108 (90 Base) MCG/ACT inhaler, Inhale 1 puff into the lungs as needed for shortness of breath / dyspnea or wheezing  ARNUITY ELLIPTA 100 MCG/ACT inhaler, Inhale 1 puff into the lungs daily  busPIRone (BUSPAR) 10 MG tablet, TAKE 1 TABLET BY MOUTH EVERY DAY FOR 3 DAYS. INCREASE TO 1 TABLET TWICE DAILY FOR 3 DAYS, THEN 1 TABLET AM AND 2 TABLETS PM THEREAFTER. Schedule follow up before additional refill needed  Calcium Carbonate-Vitamin D (CALCIUM-VITAMIN D3 PO), Take by mouth 2 times daily  DOCUSATE SODIUM PO, Take by mouth daily  FLUoxetine 20 MG tablet, Take 2 tablets by mouth daily  gabapentin (NEURONTIN) 100 MG capsule, Take 2 capsules by mouth 2 capsules twice a day  ipratropium (ATROVENT) 0.03 % nasal spray, Spray 2 sprays in nostril 2 times daily  olopatadine (PATANOL) 0.1 % ophthalmic solution, 1 drop as needed for allergies  Omega-3 Fatty Acids (FISH OIL PO), Take by mouth daily  Probiotic Product (PROBIOTIC DAILY PO), Take by mouth daily  traMADol (ULTRAM) 50 MG tablet, Take  mg by mouth as needed  traZODone (DESYREL) 50 MG tablet, Take 25-50 mg by mouth At Bedtime  celecoxib (CELEBREX) 200 MG capsule, Take 200 mg by mouth as needed for moderate pain (Patient not taking: Reported on 6/10/2022)    No current facility-administered medications on file prior to visit.    Allergies   Allergen Reactions     Animal Dander      Mold      Seasonal Allergies         Problem (# of Occurrences) Relation (Name,Age of Onset)    Atrial fibrillation (1) Father    Cancer (2) Mother: Breast,  "Sister: Breast        Social History     Tobacco Use     Smoking status: Never Smoker     Smokeless tobacco: Never Used   Substance Use Topics     Alcohol use: Yes     Comment: a glass of wine with dinner     Drug use: Never       REVIEW OF SYSTEMS:                                                      10-point review of systems is negative except as mentioned above in HPI.     EXAM:                                                      Physical Exam:   Vitals: /75 (BP Location: Right arm, Patient Position: Sitting)   Pulse 60   Ht 1.702 m (5' 7\")   Wt 65.3 kg (144 lb)   BMI 22.55 kg/m    BMI= Body mass index is 22.55 kg/m .  GENERAL: NAD.  HEENT: NC/AT.   CV: RRR. S1, S2.   NECK: No bruits.  PULM: Non-labored breathing.   Neurologic:  MENTAL STATUS: Alert, attentive. Speech is fluent. Normal comprehension. Normal concentration. Adequate fund of knowledge.   CRANIAL NERVES: Discs technically difficult to visualize. Visual fields intact to confrontation. Pupils equally, round and reactive to light. Facial sensation and movement normal. EOM full. Hearing intact to conversation. Trapezius strength intact. Palate moves symmetrically. Tongue midline.  MOTOR: 5/5 in proximal and distal muscle groups of upper and lower extremities. Tone and bulk normal.   DTRs: Intact and symmetric in biceps, BR, patellae. Babinski down-going bilaterally.   SENSATION: Normal light touch and pinprick. Intact proprioception at great toes. Vibration: Normal at both ankles.   COORDINATION: Normal finger nose finger. Finger tapping normal. Knee heel shin normal.  STATION AND GAIT: Casual gait is normal.  Left hand-dominant.    Relevant Data:  MRI Brain (7.2.10):  Findings:       There is no mass affect or midline shift. The ventricles are not   enlarged out of proportion to the cerebral sulci. The gray white   matter differentiation of the cerebral hemispheres is preserved. No   definite abnormal signal is visualized within the medial " temporal   lobes, and there is no definite atrophy or volume loss in those areas.           Postcontrast images demonstrate no abnormal intracranial enhancing   lesions.       The major intracranial vascular flow-voids appear patent. The orbits,   the visualized portions of the paranasal sinuses, and mastoid air   cells are relatively unremarkable.       Impression:       1. No evidence of abnormal enhancing lesions intracranially.   2. The visualized intracranial structures appear normal.    Electroencephalogram (7.2.10):  RESULTS:  This electroencephalogram shows generally a normal background approximately 10 Hz.  This is fairly well modulated and symmetrical.  Beta activities are seen mild and anteriorly prominent.  Eye leads were utilized during the recording and eye movements were noted.  Photic stimulation produced an excellent driving response which appeared better developed on the right.  It was present on the left.     Sleep activities are clearly noted in the recording.  Hyperventilation activation was conducted, and no abnormalities were induced during hyperventilation.  Following hyperventilation, the patient was intermittently sleep.  Sleep architecture was normal.  There was question of frontopolar spikes sharp waves but this was deemed to be an artifact.  The sleep portion of the recording shows no abnormalities and no activation of any abnormal discharges are seen in the record.       EKG showed a regular rhythm of approximately 60.     CONCLUSION:  A normal awake and sleep study with sleep deprivation.     ASSESSMENT and PLAN:                                                      Assessment:     ICD-10-CM    1. Seizure disorder (H)  G40.909 Keppra (Levetiracetam) Level     Basic metabolic panel   2. Localization-related focal epilepsy with simple partial seizures (H)  G40.109 levETIRAcetam (KEPPRA) 500 MG tablet   3. Encounter for long-term (current) use of medications  Z79.899 Keppra (Levetiracetam)  Level     Basic metabolic panel        Ms. Hutson is a pleasant 62-year-old woman here for follow-up regarding epilepsy.  She has history of one presumed seizure and auras for which she was started on Keppra.  She has been seizure and aura free since.  No problems with side effects on the Keppra so we decided to continue at the current dosing and check a level with the metabolic panel today.  We did discuss possibly tapering her off of the medication but she would like to continue.  I would like to see Zuri back in clinic in 1 year.  She expressed understanding and agreement with the plan.    Plan:  -- Labs: Keppra level and metabolic panel.  We will notify you of the results.  -- Continue the Keppra: 500mg twice daily.    -- Follow-up in Neurology clinic in 1 year.  Please let us know if any concerns arise in the meantime.    Total Time: 40 minutes were spent with the patient and in chart review/documentation (as described above in Assessment and Plan) /coordinating the care on date of service.    Gloria Domingo MD  Neurology    CC: Risa Negrete software used in the dictation of this note.        Again, thank you for allowing me to participate in the care of your patient.        Sincerely,        Gloria Domingo MD

## 2022-06-11 LAB — LEVETIRACETAM SERPL-MCNC: 14 UG/ML

## 2022-06-19 NOTE — RESULT ENCOUNTER NOTE
Please let Zuri know that her Keppra level is in therapeutic range.  Continue current dose, as discussed in clinic.  Her metabolic panel also looks good.    Thank you,  Dr. Domingo

## 2022-10-15 ENCOUNTER — HEALTH MAINTENANCE LETTER (OUTPATIENT)
Age: 62
End: 2022-10-15

## 2023-03-26 ENCOUNTER — HEALTH MAINTENANCE LETTER (OUTPATIENT)
Age: 63
End: 2023-03-26

## 2023-05-27 ENCOUNTER — HEALTH MAINTENANCE LETTER (OUTPATIENT)
Age: 63
End: 2023-05-27

## 2023-07-06 DIAGNOSIS — G40.109 LOCALIZATION-RELATED FOCAL EPILEPSY WITH SIMPLE PARTIAL SEIZURES (H): ICD-10-CM

## 2023-07-06 RX ORDER — LEVETIRACETAM 500 MG/1
500 TABLET ORAL 2 TIMES DAILY
Qty: 180 TABLET | Refills: 1 | Status: SHIPPED | OUTPATIENT
Start: 2023-07-06 | End: 2023-10-19

## 2023-07-06 NOTE — TELEPHONE ENCOUNTER
Refill request for: levETIRAcetam (KEPPRA) 500 MG tablet   Directions: Take 1 tablet (500 mg) by mouth 2 times daily     LOV: 6/10/22  NOV: 10/25/23    90 day supply with 1 refills Medication T'd for review and signature  Dasia Conner CMA on 7/6/2023 at 9:08 AM

## 2023-10-19 ENCOUNTER — TELEPHONE (OUTPATIENT)
Dept: NEUROLOGY | Facility: CLINIC | Age: 63
End: 2023-10-19
Payer: COMMERCIAL

## 2023-10-19 DIAGNOSIS — G40.109 LOCALIZATION-RELATED FOCAL EPILEPSY WITH SIMPLE PARTIAL SEIZURES (H): ICD-10-CM

## 2023-10-19 RX ORDER — LEVETIRACETAM 500 MG/1
500 TABLET ORAL 2 TIMES DAILY
Qty: 180 TABLET | Refills: 0 | Status: SHIPPED | OUTPATIENT
Start: 2023-10-19 | End: 2023-12-29

## 2023-10-19 NOTE — TELEPHONE ENCOUNTER
[facility] Call Center    Phone Message    May a detailed message be left on voicemail: yes     Reason for Call: Medication Question or concern regarding medication   Prescription Clarification  Name of Medication: levETIRAcetam (KEPPRA) 500 MG tablet  Prescribing Provider:     Pharmacy: [id number] - [place], [place] - [address] AT [facility] [address]    What on the order needs clarification?     Patient called to reschedule her appt, she is schedule to see Belle Serrano on 12/19. Patient is asking for a 90 day supply of her Keppra, per pt 90 day supply will help with cost.       Action Taken: Other: [facility] neurology    Travel Screening: Not Applicable

## 2023-10-19 NOTE — TELEPHONE ENCOUNTER
LOV: 6/10/22    NOV: 12/29    Per LOV note:   Plan:  -- Labs: Keppra level and metabolic panel.  We will notify you of the results.  -- Continue the Keppra: 500mg twice daily.      Refill: Signed Prescriptions:                        Disp   Refills    levETIRAcetam (KEPPRA) 500 MG tablet       180 ta*0        Sig: Take 1 tablet (500 mg) by mouth 2 times daily  Authorizing Provider: VALE FERRARA  Ordering User: JOAO BRADSHAW    90-day supply sent. Patient needs to maintain appointment for future refills.    Joao Bradshaw, RN, BSN  [facility] Neurology

## 2023-12-28 NOTE — PROGRESS NOTES
__________________________________  ESTABLISHED PATIENT NEUROLOGY NOTE    DATE OF VISIT: 12/29/2023  MRN: [id number]  PATIENT NAME: Zuri Hutson  YOB: 1960    Chief Complaint   Patient presents with    Seizures     No concerns     SUBJECTIVE:                                                      HISTORY OF PRESENT ILLNESS:  Zuri is here for follow up regarding seizures    Zuri Hutson is a 63 year old female with a history of partial seizure disorder and a foraminectomy.  She follows with Dr. Domingo in the clinic last seen 6/10/2022.  Per chart review,  she saw Dr. Guzmán at the [facility] in 2010 after an episode of loss of consciousness that occurred during a meeting.  She had been taken to [facility] for evaluation.  No particular stressors prior.  She was described as limp and slightly tremulous, and when she presented to the ER she was somewhat confused.  No tongue bite.  She recalled having a feeling of panic and rising abdominal sensation prior to the event.  No history of meningitis, encephalitis or head injury.  History of 1 seizure and a nephew.  She has otherwise been quite healthy.  She does have asthma.  EEG in 7.2010 was normal.  MRI brain at that time was also normal.  Keppra level was therapeutic at 32.9 in 2019.     Zuri says that several years ago she had a few days of aura sensations after that first event so she believes that at that time they decided to start her on the Keppra.  She has been on it since and has not had any auras or recurrence of seizure for many years.    12/29/23: Zuri presents to the clinic today for her annual seizure follow-up.  She tells me that she had 1 seizure in 2009 that resulted in loss of consciousness.  She did not start medications right away.  She began having auras that are described as a rising feeling in her stomach and chest.  She started on Keppra and denies any seizure activity or auras since that timeframe.  Patient denies any loss of  consciousness, zoning out or staring spells.  No involuntary muscle movement.  She denies any period of unexplained time loss.  She is taking her medications as prescribed and tolerating well without adverse effect.  No other concerns at this time     Current Anti-Seizure Medications:     Keppra: 500mg twice daily     Perceived AED Side Effects: No    Medication Notes:   AED Medication Compliance:  compliant      Psycho-Social History: Zuri Hutson currently lives [place] with . Highest level of education Law school . Employment status: soon to retired in 50 days    Patient does not smoke, alcohol use- daily wine 3 glasses, no recreational drug use.  Currently, patient denies feeling depressed, denies feeling anhedonia, denies suicidal  thoughts, and denies having feelings of excessive guilt/worthlessness.  We reviewed importance of mental and emotional wellbeing and impact on health.      Date of last seizure: 2009   Driving:  Currently patient is:  Driving: Patient was made aware of state driving laws. Currently meets criteria to continue to drive.     Explained driving restrictions as per state law. No driving until approved by appropriate state licensing authorities. It is a licensed 's responsibility to be aware of and comply with laws and regulations regarding driving privileges and loss of awareness or alteration in ability to maintain control of a motor vehicle.   Current Medications:   albuterol (PROAIR HFA/PROVENTIL HFA/VENTOLIN HFA) 108 (90 Base) MCG/ACT inhaler, Inhale 1 puff into the lungs as needed for shortness of breath / dyspnea or wheezing  ARNUITY ELLIPTA 100 MCG/ACT inhaler, Inhale 1 puff into the lungs daily  busPIRone (BUSPAR) 10 MG tablet, TAKE 1 TABLET BY MOUTH EVERY DAY FOR 3 DAYS. INCREASE TO 1 TABLET TWICE DAILY FOR 3 DAYS, THEN 1 TABLET AM AND 2 TABLETS PM THEREAFTER. Schedule follow up before additional refill needed  Calcium Carbonate-Vitamin D (CALCIUM-VITAMIN D3 PO),  "Take by mouth 2 times daily  celecoxib (CELEBREX) 200 MG capsule, Take 200 mg by mouth as needed for moderate pain (Patient not taking: Reported on 6/10/2022)  DOCUSATE SODIUM PO, Take by mouth daily  FLUoxetine 20 MG tablet, Take 2 tablets by mouth daily  gabapentin (NEURONTIN) 100 MG capsule, Take 2 capsules by mouth 2 capsules twice a day  ipratropium (ATROVENT) 0.03 % nasal spray, Spray 2 sprays in nostril 2 times daily  olopatadine (PATANOL) 0.1 % ophthalmic solution, 1 drop as needed for allergies  Omega-3 Fatty Acids (FISH OIL PO), Take by mouth daily  Probiotic Product (PROBIOTIC DAILY PO), Take by mouth daily  traMADol (ULTRAM) 50 MG tablet, Take  mg by mouth as needed  traZODone (DESYREL) 50 MG tablet, Take 25-50 mg by mouth At Bedtime    No current facility-administered medications on file prior to visit.    Past Medical History:   Patient  has no past medical history on file.  Surgical History:  She  has no past surgical history on file.  Family and Social History:  Reviewed, and she  reports that she has never smoked. She has never used smokeless tobacco. She reports current alcohol use. She reports that she does not use drugs.  Reviewed, and family history includes Atrial fibrillation in her father; Cancer in her mother and sister.    RECENT DIAGNOSTIC STUDIES:   Labs:06/10/22  Keppra (Levetiracetam) Level  10 - 40 ug/mL 14     Imaging:   Brain MRI without and with contrast 7/2/2010   Impression:   1. No evidence of abnormal enhancing lesions intracranially.   2. The visualized intracranial structures appear normal.      REVIEW OF SYSTEMS:                                                      10-point review of systems is negative except as mentioned above in HPI.    EXAM:                                                      Physical Exam:   Vitals: /63   Pulse 61   Ht 1.702 m (5' 7\")   Wt 63.5 kg (140 lb)   BMI 21.93 kg/m    BMI= Body mass index is 21.93 kg/m .  GENERAL: NAD.  HEENT: " NC/AT.  PULM: Non-labored breathing.   Neurologic:  MENTAL STATUS: Alert, attentive. Speech is fluent. Normal comprehension. Normal concentration. Adequate fund of knowledge.   CRANIAL NERVES: Visual fields intact to confrontation. Pupils equally, round and reactive to light. Facial sensation and movement normal. EOM full. Hearing intact to conversation. Trapezius strength intact. Palate moves symmetrically. Tongue midline.  MOTOR: 5/5 in proximal and distal muscle groups of upper and lower extremities. Tone and bulk normal.   SENSATION: Normal light touch throughout.   COORDINATION: Normal finger nose finger. Finger tapping normal. Knee heel shin normal.  STATION AND GAIT: Romberg Negative. Good postural reflexes. Casual gait and tandem Normal  left hand-dominant.      ASSESSMENT and PLAN:                                                      Assessment:    ICD-10-CM    1. Localization-related focal epilepsy with simple partial seizures (H)  G40.109 Keppra (Levetiracetam) Level     levETIRAcetam (KEPPRA) 500 MG tablet          Zuri Hutson is a 63 year old female with a history of partial seizure disorder and a foraminectomy.  She follows with Dr. Domingo in the clinic last seen 6/10/2022. Patient has remained seizure free on current treatment plan of Keppra 500 mg twice daily. I will order labs to monitor the drug parameters. We discussed interventions, will plan to see the patient back in 12 months. Zuri understands and agrees with this plan.     Plan:  --- Continue  Keppra: 500mg twice daily   --- Labs: Keppra level  --- Take your medications as prescribed, and do not stop taking abruptly.  --- Try to take your anti-seizure medications at the same time every day  --- Avoid common triggers: sleep deprivation, excessive alcohol, stress, flashing lights or patterns, dehydration, recreational drug use, illness and missed medications.  --- Plan on follow up in the Neurology Clinic in 12 months.  --- Please feel free  to reach out if you have any further questions or concerns.  --- Seek immediate medical attention if an emergency arises or if your health becomes progressively worse.     It was a pleasure to see you today!     Total Time: Total time spent for face to face visit, reviewing labs/imaging studies, counseling and coordination of care was: 15 Minutes spent on the date of the encounter doing chart review, review of test results, interpretation of tests, patient visit, and documentation     This note was dictated using voice recognition software.  Any grammatical or context distortions are unintentional and inherent to the software.    Belle Serrano, DNP, APRN, CNP  [facility]

## 2023-12-29 ENCOUNTER — OFFICE VISIT (OUTPATIENT)
Dept: NEUROLOGY | Facility: CLINIC | Age: 63
End: 2023-12-29
Payer: COMMERCIAL

## 2023-12-29 ENCOUNTER — LAB (OUTPATIENT)
Dept: LAB | Facility: HOSPITAL | Age: 63
End: 2023-12-29
Payer: COMMERCIAL

## 2023-12-29 VITALS
HEIGHT: 67 IN | HEART RATE: 61 BPM | WEIGHT: 140 LBS | DIASTOLIC BLOOD PRESSURE: 63 MMHG | SYSTOLIC BLOOD PRESSURE: 105 MMHG | BODY MASS INDEX: 21.97 KG/M2

## 2023-12-29 DIAGNOSIS — G40.109 LOCALIZATION-RELATED FOCAL EPILEPSY WITH SIMPLE PARTIAL SEIZURES (H): ICD-10-CM

## 2023-12-29 LAB — LEVETIRACETAM SERPL-MCNC: 25.6 ΜG/ML (ref 10–40)

## 2023-12-29 PROCEDURE — 80177 DRUG SCRN QUAN LEVETIRACETAM: CPT

## 2023-12-29 PROCEDURE — 99212 OFFICE O/P EST SF 10 MIN: CPT

## 2023-12-29 PROCEDURE — 36415 COLL VENOUS BLD VENIPUNCTURE: CPT

## 2023-12-29 RX ORDER — LEVETIRACETAM 500 MG/1
500 TABLET ORAL 2 TIMES DAILY
Qty: 180 TABLET | Refills: 3 | Status: SHIPPED | OUTPATIENT
Start: 2023-12-29

## 2023-12-29 NOTE — PATIENT INSTRUCTIONS
Plan:  --- Continue  Keppra: 500mg twice daily   --- Labs: Keppra level  --- Take your medications as prescribed, and do not stop taking abruptly.  --- Try to take your anti-seizure medications at the same time every day  --- Avoid common triggers: sleep deprivation, excessive alcohol, stress, flashing lights or patterns, dehydration, recreational drug use, illness and missed medications.  --- Plan on follow up in the Neurology Clinic in 12 months.  --- Please feel free to reach out if you have any further questions or concerns.  --- Seek immediate medical attention if an emergency arises or if your health becomes progressively worse.     It was a pleasure to see you today!

## 2023-12-29 NOTE — NURSING NOTE
Chief Complaint   Patient presents with    Seizures     No concerns     Sammie Monet on 12/29/2023 at 11:18 AM

## 2023-12-29 NOTE — LETTER
12/29/2023         RE: Zuri Hutson  [address] [place] [id number]        Dear Colleague,    Thank you for referring your patient, Zuri Hutson, to the [facility] NEUROLOGY CLINIC [place]. Please see a copy of my visit note below.      __________________________________  ESTABLISHED PATIENT NEUROLOGY NOTE    DATE OF VISIT: 12/29/2023  MRN: [id number]  PATIENT NAME: Zuri Hutson  YOB: 1960    Chief Complaint   Patient presents with     Seizures     No concerns     SUBJECTIVE:                                                      HISTORY OF PRESENT ILLNESS:  Zuri is here for follow up regarding seizures    Zuri Hutson is a 63 year old female with a history of partial seizure disorder and a foraminectomy.  She follows with Dr. Domingo in the clinic last seen 6/10/2022.  Per chart review,  she saw Dr. Guzmán at the [facility] in 2010 after an episode of loss of consciousness that occurred during a meeting.  She had been taken to [facility] for evaluation.  No particular stressors prior.  She was described as limp and slightly tremulous, and when she presented to the ER she was somewhat confused.  No tongue bite.  She recalled having a feeling of panic and rising abdominal sensation prior to the event.  No history of meningitis, encephalitis or head injury.  History of 1 seizure and a nephew.  She has otherwise been quite healthy.  She does have asthma.  EEG in 7.2010 was normal.  MRI brain at that time was also normal.  Keppra level was therapeutic at 32.9 in 2019.     Zuri says that several years ago she had a few days of aura sensations after that first event so she believes that at that time they decided to start her on the Keppra.  She has been on it since and has not had any auras or recurrence of seizure for many years.    12/29/23: Zuri presents to the clinic today for her annual seizure follow-up.  She tells me that she had 1 seizure in 2009 that resulted in loss of  consciousness.  She did not start medications right away.  She began having auras that are described as a rising feeling in her stomach and chest.  She started on Keppra and denies any seizure activity or auras since that timeframe.  Patient denies any loss of consciousness, zoning out or staring spells.  No involuntary muscle movement.  She denies any period of unexplained time loss.  She is taking her medications as prescribed and tolerating well without adverse effect.  No other concerns at this time     Current Anti-Seizure Medications:     Keppra: 500mg twice daily     Perceived AED Side Effects: No    Medication Notes:   AED Medication Compliance:  compliant      Psycho-Social History: Zuri Hutson currently lives [place] with . Highest level of education Law school . Employment status: soon to retired in 50 days    Patient does not smoke, alcohol use- daily wine 3 glasses, no recreational drug use.  Currently, patient denies feeling depressed, denies feeling anhedonia, denies suicidal  thoughts, and denies having feelings of excessive guilt/worthlessness.  We reviewed importance of mental and emotional wellbeing and impact on health.      Date of last seizure: 2009   Driving:  Currently patient is:  Driving: Patient was made aware of state driving laws. Currently meets criteria to continue to drive.     Explained driving restrictions as per state law. No driving until approved by appropriate state licensing authorities. It is a licensed 's responsibility to be aware of and comply with laws and regulations regarding driving privileges and loss of awareness or alteration in ability to maintain control of a motor vehicle.   Current Medications:   albuterol (PROAIR HFA/PROVENTIL HFA/VENTOLIN HFA) 108 (90 Base) MCG/ACT inhaler, Inhale 1 puff into the lungs as needed for shortness of breath / dyspnea or wheezing  ARNUITY ELLIPTA 100 MCG/ACT inhaler, Inhale 1 puff into the lungs daily  busPIRone  (BUSPAR) 10 MG tablet, TAKE 1 TABLET BY MOUTH EVERY DAY FOR 3 DAYS. INCREASE TO 1 TABLET TWICE DAILY FOR 3 DAYS, THEN 1 TABLET AM AND 2 TABLETS PM THEREAFTER. Schedule follow up before additional refill needed  Calcium Carbonate-Vitamin D (CALCIUM-VITAMIN D3 PO), Take by mouth 2 times daily  celecoxib (CELEBREX) 200 MG capsule, Take 200 mg by mouth as needed for moderate pain (Patient not taking: Reported on 6/10/2022)  DOCUSATE SODIUM PO, Take by mouth daily  FLUoxetine 20 MG tablet, Take 2 tablets by mouth daily  gabapentin (NEURONTIN) 100 MG capsule, Take 2 capsules by mouth 2 capsules twice a day  ipratropium (ATROVENT) 0.03 % nasal spray, Spray 2 sprays in nostril 2 times daily  olopatadine (PATANOL) 0.1 % ophthalmic solution, 1 drop as needed for allergies  Omega-3 Fatty Acids (FISH OIL PO), Take by mouth daily  Probiotic Product (PROBIOTIC DAILY PO), Take by mouth daily  traMADol (ULTRAM) 50 MG tablet, Take  mg by mouth as needed  traZODone (DESYREL) 50 MG tablet, Take 25-50 mg by mouth At Bedtime    No current facility-administered medications on file prior to visit.    Past Medical History:   Patient  has no past medical history on file.  Surgical History:  She  has no past surgical history on file.  Family and Social History:  Reviewed, and she  reports that she has never smoked. She has never used smokeless tobacco. She reports current alcohol use. She reports that she does not use drugs.  Reviewed, and family history includes Atrial fibrillation in her father; Cancer in her mother and sister.    RECENT DIAGNOSTIC STUDIES:   Labs:06/10/22  Keppra (Levetiracetam) Level  10 - 40 ug/mL 14     Imaging:   Brain MRI without and with contrast 7/2/2010   Impression:   1. No evidence of abnormal enhancing lesions intracranially.   2. The visualized intracranial structures appear normal.      REVIEW OF SYSTEMS:                                                      10-point review of systems is negative except  "as mentioned above in HPI.    EXAM:                                                      Physical Exam:   Vitals: /63   Pulse 61   Ht 1.702 m (5' 7\")   Wt 63.5 kg (140 lb)   BMI 21.93 kg/m    BMI= Body mass index is 21.93 kg/m .  GENERAL: NAD.  HEENT: NC/AT.  PULM: Non-labored breathing.   Neurologic:  MENTAL STATUS: Alert, attentive. Speech is fluent. Normal comprehension. Normal concentration. Adequate fund of knowledge.   CRANIAL NERVES: Visual fields intact to confrontation. Pupils equally, round and reactive to light. Facial sensation and movement normal. EOM full. Hearing intact to conversation. Trapezius strength intact. Palate moves symmetrically. Tongue midline.  MOTOR: 5/5 in proximal and distal muscle groups of upper and lower extremities. Tone and bulk normal.   SENSATION: Normal light touch throughout.   COORDINATION: Normal finger nose finger. Finger tapping normal. Knee heel shin normal.  STATION AND GAIT: Romberg Negative. Good postural reflexes. Casual gait and tandem Normal  left hand-dominant.      ASSESSMENT and PLAN:                                                      Assessment:    ICD-10-CM    1. Localization-related focal epilepsy with simple partial seizures (H)  G40.109 Keppra (Levetiracetam) Level     levETIRAcetam (KEPPRA) 500 MG tablet          Zuri Hutson is a 63 year old female with a history of partial seizure disorder and a foraminectomy.  She follows with Dr. Domingo in the clinic last seen 6/10/2022. Patient has remained seizure free on current treatment plan of Keppra 500 mg twice daily. I will order labs to monitor the drug parameters. We discussed interventions, will plan to see the patient back in 12 months. Zuri understands and agrees with this plan.     Plan:  --- Continue  Keppra: 500mg twice daily   --- Labs: Keppra level  --- Take your medications as prescribed, and do not stop taking abruptly.  --- Try to take your anti-seizure medications at the same time " every day  --- Avoid common triggers: sleep deprivation, excessive alcohol, stress, flashing lights or patterns, dehydration, recreational drug use, illness and missed medications.  --- Plan on follow up in the Neurology Clinic in 12 months.  --- Please feel free to reach out if you have any further questions or concerns.  --- Seek immediate medical attention if an emergency arises or if your health becomes progressively worse.     It was a pleasure to see you today!     Total Time: Total time spent for face to face visit, reviewing labs/imaging studies, counseling and coordination of care was: 15 Minutes spent on the date of the encounter doing chart review, review of test results, interpretation of tests, patient visit, and documentation     This note was dictated using voice recognition software.  Any grammatical or context distortions are unintentional and inherent to the software.    Belle Serrano, LISHA, APRN, CNP  [facility]                     Again, thank you for allowing me to participate in the care of your patient.        Sincerely,        YOVANI Denson CNP

## 2024-03-17 ENCOUNTER — HEALTH MAINTENANCE LETTER (OUTPATIENT)
Age: 64
End: 2024-03-17

## 2025-01-06 ENCOUNTER — OFFICE VISIT (OUTPATIENT)
Dept: NEUROLOGY | Facility: CLINIC | Age: 65
End: 2025-01-06
Payer: COMMERCIAL

## 2025-01-06 VITALS
HEART RATE: 62 BPM | DIASTOLIC BLOOD PRESSURE: 64 MMHG | HEIGHT: 67 IN | BODY MASS INDEX: 21.19 KG/M2 | SYSTOLIC BLOOD PRESSURE: 111 MMHG | WEIGHT: 135 LBS

## 2025-01-06 DIAGNOSIS — Z79.899 ENCOUNTER FOR LONG-TERM (CURRENT) USE OF MEDICATIONS: ICD-10-CM

## 2025-01-06 DIAGNOSIS — G40.109 LOCALIZATION-RELATED FOCAL EPILEPSY WITH SIMPLE PARTIAL SEIZURES (H): Primary | ICD-10-CM

## 2025-01-06 PROCEDURE — 99213 OFFICE O/P EST LOW 20 MIN: CPT | Performed by: PSYCHIATRY & NEUROLOGY

## 2025-01-06 RX ORDER — LEVETIRACETAM 500 MG/1
500 TABLET ORAL 2 TIMES DAILY
Qty: 180 TABLET | Refills: 3 | Status: SHIPPED | OUTPATIENT
Start: 2025-01-06

## 2025-01-06 NOTE — PATIENT INSTRUCTIONS
Plan:  Continue the Keppra: 500mg twice daily.  Keppra level when you are in for your annual visit with your primary.  We will send the order over to that clinic.  Return to neurology clinic in 1 year.  Please let us know if any concerns arise in the meantime.

## 2025-01-06 NOTE — PROGRESS NOTES
ESTABLISHED PATIENT NEUROLOGY NOTE    DATE OF VISIT: 1/6/2025  MRN: [id number]  PATIENT NAME: Zuri Hutson  YOB: 1960    Chief Complaint   Patient presents with    Seizures     Patient has been seizure free. Follow up     SUBJECTIVE:                                                      HISTORY OF PRESENT ILLNESS:  Zuri is here for follow up regarding  partial seizure disorder.      History as previously documented by me (6.10.22):  She previously followed with Dr. Garvin in this clinic for the same.  When they last spoke in 11.2020, she denied seizure recurrence.  She has been on Keppra: 500mg twice daily.  Additional history of foramenectomy done by Dr. Richmond.     Looking further back in the chart, she saw Dr. Guzmán at the [facility] in 2010 after an episode of loss of consciousness that occurred during a meeting.  She had been taken to [facility] for evaluation.  No particular stressors prior.  She was described as limp and slightly tremulous, and when she presented to the ER she was somewhat confused.  No tongue bite.  She recalled having a feeling of panic and rising abdominal sensation prior to the event.  No history of meningitis, encephalitis or head injury.  History of 1 seizure and a nephew.  She has otherwise been quite healthy.  She does have asthma.  EEG in 7.2010 was normal.  MRI brain at that time was also normal.  Keppra level was therapeutic at 32.9 in 2019.     Zuri says that several years ago she had a few days of aura sensations after that first event so she believes that at that time they decided to start her on the Keppra.  She has been on it since and has not had any auras or recurrence of seizure for many years.  She says that she has no concerns about the Keppra, no side effects.  Denies headaches, visual changes, weakness or sensory loss.     She tells me that she was having left leg sciatic pain prior to her surgery.  Symptoms have since resolved.  I note she is also on  gabapentin.     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    Our plan was to continue the Keppra: 500mg twice daily.  She saw our nurse practitioner here in clinic last year for follow-up.  Keppra level was therapeutic at 25.6 at that time.  No recurrence of seizures or any worrisome symptoms.  Zuri says she is tolerating the Keppra just fine.  No new concerns.    CURRENT MEDICATIONS:   Current Outpatient Medications   Medication Sig Dispense Refill    albuterol (PROAIR HFA/PROVENTIL HFA/VENTOLIN HFA) 108 (90 Base) MCG/ACT inhaler Inhale 1 puff into the lungs as needed for shortness of breath / dyspnea or wheezing      ARNUITY ELLIPTA 100 MCG/ACT inhaler Inhale 1 puff into the lungs daily      Calcium Carbonate-Vitamin D (CALCIUM-VITAMIN D3 PO) Take by mouth 2 times daily      FLUoxetine 20 MG tablet Take 2 tablets by mouth daily      gabapentin (NEURONTIN) 100 MG capsule Take 2 capsules by mouth 2 capsules twice a day      ipratropium (ATROVENT) 0.03 % nasal spray Spray 2 sprays in nostril 2 times daily      levETIRAcetam (KEPPRA) 500 MG tablet Take 1 tablet (500 mg) by mouth 2 times daily. 180 tablet 3    olopatadine (PATANOL) 0.1 % ophthalmic solution 1 drop as needed for allergies      Omega-3 Fatty Acids (FISH OIL PO) Take by mouth daily      Probiotic Product (PROBIOTIC DAILY PO) Take by mouth daily      traMADol (ULTRAM) 50 MG tablet Take  mg by mouth as needed      traZODone (DESYREL) 50 MG tablet Take 25-50 mg by mouth At Bedtime      busPIRone (BUSPAR) 10 MG tablet TAKE 1 TABLET BY MOUTH EVERY DAY FOR 3 DAYS. INCREASE TO 1 TABLET TWICE DAILY FOR 3 DAYS, THEN 1 TABLET AM AND 2 TABLETS PM THEREAFTER. Schedule follow up before additional refill needed      celecoxib (CELEBREX) 200 MG capsule Take 200 mg by mouth as needed for moderate pain (Patient not taking: Reported on 6/10/2022)      DOCUSATE SODIUM PO Take by mouth daily       No current facility-administered medications for this visit.       RECENT  "DIAGNOSTIC STUDIES:   Labs: No results found for any visits on 01/06/25.    REVIEW OF SYSTEMS:                                                      10-point review of systems is negative except as mentioned above in HPI.    EXAM:                                                      Physical Exam:   Vitals: /64   Pulse 62   Ht 1.702 m (5' 7\")   Wt 61.2 kg (135 lb)   BMI 21.14 kg/m    BMI= Body mass index is 21.14 kg/m .  GENERAL: NAD.  HEENT: NC/AT.  PULM: Non-labored breathing.   Focused Neurologic:  MENTAL STATUS: Alert, attentive. Speech is fluent. Normal comprehension. Normal concentration. Adequate fund of knowledge.   CRANIAL NERVES: Visual fields intact to confrontation. Pupils equally, round and reactive to light. Facial sensation and movement normal. EOM full. Hearing intact to conversation. Trapezius strength intact. Palate moves symmetrically. Tongue midline.  MOTOR: 5/5 in proximal and distal muscle groups of upper and lower extremities. Tone and bulk normal.   SENSATION: Normal light touch throughout.  COORDINATION: Normal fine motor movements.  STATION AND GAIT: Casual gait is normal.  Left hand-dominant.    ASSESSMENT and PLAN:                                                      Assessment:    ICD-10-CM    1. Localization-related focal epilepsy with simple partial seizures (H)  G40.109 levETIRAcetam (KEPPRA) 500 MG tablet     Keppra (Levetiracetam) Level      2. Encounter for long-term (current) use of medications  Z79.899 Keppra (Levetiracetam) Level          Ms. Hutson is a pleasant 64-year-old woman here for follow-up regarding partial epilepsy.  She is doing well on the Keppra.  We will check an updated level when she is in to see her primary in the near future, otherwise no changes recommended at this time.  Zuri expressed understanding and agreement with the plan.    Plan:  Continue the Keppra: 500mg twice daily.  Keppra level when you are in for your annual visit with your primary.  " We will send the order over to that clinic.  Return to neurology clinic in 1 year.  Please let us know if any concerns arise in the meantime.    Total Time: 22 minutes were spent with the patient and in chart review/documentation (as described above in Assessment and Plan)/coordinating the care on date of service.    Gloria Domingo MD  Neurology    [employer] software used in the dictation of this note.

## 2025-01-06 NOTE — NURSING NOTE
Chief Complaint   Patient presents with    Seizures     Patient has been seizure free. Follow up     Sammie Monet on 1/6/2025 at 9:59 AM

## 2025-01-06 NOTE — LETTER
1/6/2025      Zuri Hutson  [address] [place] [phone]      Dear Colleague,    Thank you for referring your patient, Zuri Hutson, to the [facility]UROLOG[facility] [place]. Please see a copy of my visit note below.    ESTABLISHED PATIENT NEUROLOGY NOTE    DATE OF VISIT: 1/6/2025  MRN: [id number]  PATIENT NAME: Zuri Hutson  YOB: 1960    Chief Complaint   Patient presents with     Seizures     Patient has been seizure free. Follow up     SUBJECTIVE:                                                      HISTORY OF PRESENT ILLNESS:  Zuri is here for follow up regarding  partial seizure disorder.      History as previously documented by me (6.10.22):  She previously followed with Dr. Garvin in this clinic for the same.  When they last spoke in 11.2020, she denied seizure recurrence.  She has been on Keppra: 500mg twice daily.  Additional history of foramenectomy done by Dr. Richmond.     Looking further back in the chart, she saw Dr. Guzmán at the [facility] in 2010 after an episode of loss of consciousness that occurred during a meeting.  She had been taken to [facility] for evaluation.  No particular stressors prior.  She was described as limp and slightly tremulous, and when she presented to the ER she was somewhat confused.  No tongue bite.  She recalled having a feeling of panic and rising abdominal sensation prior to the event.  No history of meningitis, encephalitis or head injury.  History of 1 seizure and a nephew.  She has otherwise been quite healthy.  She does have asthma.  EEG in 7.2010 was normal.  MRI brain at that time was also normal.  Keppra level was therapeutic at 32.9 in 2019.     Zuri says that several years ago she had a few days of aura sensations after that first event so she believes that at that time they decided to start her on the Keppra.  She has been on it since and has not had any auras or recurrence of seizure for many years.  She says that she has  no concerns about the Keppra, no side effects.  Denies headaches, visual changes, weakness or sensory loss.     She tells me that she was having left leg sciatic pain prior to her surgery.  Symptoms have since resolved.  I note she is also on gabapentin.     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    Our plan was to continue the Keppra: 500mg twice daily.  She saw our nurse practitioner here in clinic last year for follow-up.  Keppra level was therapeutic at 25.6 at that time.  No recurrence of seizures or any worrisome symptoms.  Zuri says she is tolerating the Keppra just fine.  No new concerns.    CURRENT MEDICATIONS:   Current Outpatient Medications   Medication Sig Dispense Refill     albuterol (PROAIR HFA/PROVENTIL HFA/VENTOLIN HFA) 108 (90 Base) MCG/ACT inhaler Inhale 1 puff into the lungs as needed for shortness of breath / dyspnea or wheezing       ARNUITY ELLIPTA 100 MCG/ACT inhaler Inhale 1 puff into the lungs daily       Calcium Carbonate-Vitamin D (CALCIUM-VITAMIN D3 PO) Take by mouth 2 times daily       FLUoxetine 20 MG tablet Take 2 tablets by mouth daily       gabapentin (NEURONTIN) 100 MG capsule Take 2 capsules by mouth 2 capsules twice a day       ipratropium (ATROVENT) 0.03 % nasal spray Spray 2 sprays in nostril 2 times daily       levETIRAcetam (KEPPRA) 500 MG tablet Take 1 tablet (500 mg) by mouth 2 times daily. 180 tablet 3     olopatadine (PATANOL) 0.1 % ophthalmic solution 1 drop as needed for allergies       Omega-3 Fatty Acids (FISH OIL PO) Take by mouth daily       Probiotic Product (PROBIOTIC DAILY PO) Take by mouth daily       traMADol (ULTRAM) 50 MG tablet Take  mg by mouth as needed       traZODone (DESYREL) 50 MG tablet Take 25-50 mg by mouth At Bedtime       busPIRone (BUSPAR) 10 MG tablet TAKE 1 TABLET BY MOUTH EVERY DAY FOR 3 DAYS. INCREASE TO 1 TABLET TWICE DAILY FOR 3 DAYS, THEN 1 TABLET AM AND 2 TABLETS PM THEREAFTER. Schedule follow up before additional refill needed        "celecoxib (CELEBREX) 200 MG capsule Take 200 mg by mouth as needed for moderate pain (Patient not taking: Reported on 6/10/2022)       DOCUSATE SODIUM PO Take by mouth daily       No current facility-administered medications for this visit.       RECENT DIAGNOSTIC STUDIES:   Labs: No results found for any visits on 01/06/25.    REVIEW OF SYSTEMS:                                                      10-point review of systems is negative except as mentioned above in HPI.    EXAM:                                                      Physical Exam:   Vitals: /64   Pulse 62   Ht 1.702 m (5' 7\")   Wt 61.2 kg (135 lb)   BMI 21.14 kg/m    BMI= Body mass index is 21.14 kg/m .  GENERAL: NAD.  HEENT: NC/AT.  PULM: Non-labored breathing.   Focused Neurologic:  MENTAL STATUS: Alert, attentive. Speech is fluent. Normal comprehension. Normal concentration. Adequate fund of knowledge.   CRANIAL NERVES: Visual fields intact to confrontation. Pupils equally, round and reactive to light. Facial sensation and movement normal. EOM full. Hearing intact to conversation. Trapezius strength intact. Palate moves symmetrically. Tongue midline.  MOTOR: 5/5 in proximal and distal muscle groups of upper and lower extremities. Tone and bulk normal.   SENSATION: Normal light touch throughout.  COORDINATION: Normal fine motor movements.  STATION AND GAIT: Casual gait is normal.  Left hand-dominant.    ASSESSMENT and PLAN:                                                      Assessment:    ICD-10-CM    1. Localization-related focal epilepsy with simple partial seizures (H)  G40.109 levETIRAcetam (KEPPRA) 500 MG tablet     Keppra (Levetiracetam) Level      2. Encounter for long-term (current) use of medications  Z79.899 Keppra (Levetiracetam) Level          Ms. Hutson is a pleasant 64-year-old woman here for follow-up regarding partial epilepsy.  She is doing well on the Keppra.  We will check an updated level when she is in to see her " primary in the near future, otherwise no changes recommended at this time.  Zuri expressed understanding and agreement with the plan.    Plan:  Continue the Keppra: 500mg twice daily.  Keppra level when you are in for your annual visit with your primary.  We will send the order over to that clinic.  Return to neurology clinic in 1 year.  Please let us know if any concerns arise in the meantime.    Total Time: 22 minutes were spent with the patient and in chart review/documentation (as described above in Assessment and Plan)/coordinating the care on date of service.    Gloria Domingo MD  Neurology    [employer] software used in the dictation of this note.                    Again, thank you for allowing me to participate in the care of your patient.        Sincerely,        Gloria Domingo MD    Electronically signed

## 2025-03-22 ENCOUNTER — HEALTH MAINTENANCE LETTER (OUTPATIENT)
Age: 65
End: 2025-03-22

## 2025-08-16 ENCOUNTER — HEALTH MAINTENANCE LETTER (OUTPATIENT)
Age: 65
End: 2025-08-16